# Patient Record
(demographics unavailable — no encounter records)

---

## 2017-04-25 NOTE — RAD
TWO VIEW CHEST:

 

History: Cough. 

 

No evidence of focal infiltrate. Heart and mediastinum appear unremarkable. 

 

IMPRESSION: 

No evidence of lung infiltrate identified. 

 

POS: [id number]